# Patient Record
Sex: FEMALE
[De-identification: names, ages, dates, MRNs, and addresses within clinical notes are randomized per-mention and may not be internally consistent; named-entity substitution may affect disease eponyms.]

---

## 2021-02-10 ENCOUNTER — RX ONLY (RX ONLY)
Age: 46
End: 2021-02-10

## 2021-02-10 ENCOUNTER — SEE NOTE (OUTPATIENT)
Dept: URBAN - METROPOLITAN AREA CLINIC 31 | Facility: CLINIC | Age: 46
Setting detail: DERMATOLOGY
End: 2021-02-10

## 2021-02-10 DIAGNOSIS — L82.0 INFLAMED SEBORRHEIC KERATOSIS: ICD-10-CM

## 2021-02-10 PROCEDURE — 99204 OFFICE O/P NEW MOD 45 MIN: CPT

## 2021-02-10 RX ORDER — TRETINOIN 0.25 MG/G
1 APPLICATION CREAM TOPICAL NIGHTLY
Qty: 45 | Refills: 3
Start: 2021-02-10

## 2021-02-24 ENCOUNTER — LASER CONSULT - N/C (OUTPATIENT)
Dept: URBAN - METROPOLITAN AREA CLINIC 30 | Facility: CLINIC | Age: 46
Setting detail: DERMATOLOGY
End: 2021-02-24

## 2021-02-24 DIAGNOSIS — Z41.9 ENCOUNTER FOR PROCEDURE FOR PURPOSES OTHER THAN REMEDYING HEALTH STATE, UNSPECIFIED: ICD-10-CM

## 2021-02-24 PROCEDURE — OTHER LASER CONSULT NC: OTHER

## 2021-03-02 ENCOUNTER — GM-FACE (INCL LIP, CHIN, UNDER CHIN, CHEEKS & SIDE (OUTPATIENT)
Dept: URBAN - METROPOLITAN AREA CLINIC 30 | Facility: CLINIC | Age: 46
Setting detail: DERMATOLOGY
End: 2021-03-02

## 2021-03-02 DIAGNOSIS — D48.5 NEOPLASM OF UNCERTAIN BEHAVIOR OF SKIN: ICD-10-CM

## 2021-03-02 PROCEDURE — OTHER LHR-(GMAX BH)-FACE (INCLUDES LIP, CHIN, UNDER THE CHIN, CHEEKS & SIDEBURNS): OTHER

## 2021-04-08 ENCOUNTER — GM-LIP, UPPER INCLUDING CHIN (OUTPATIENT)
Dept: URBAN - METROPOLITAN AREA CLINIC 30 | Facility: CLINIC | Age: 46
Setting detail: DERMATOLOGY
End: 2021-04-08

## 2021-04-08 DIAGNOSIS — R68.89 OTHER GENERAL SYMPTOMS AND SIGNS: ICD-10-CM

## 2021-04-08 PROCEDURE — OTHER LHR-(GMAX BH)-FACE (INCLUDES LIP, CHIN, UNDER THE CHIN, CHEEKS & SIDEBURNS): OTHER

## 2021-07-02 ENCOUNTER — GM-FACE (INCL LIP, CHIN, UNDER CHIN, CHEEKS & SIDE (OUTPATIENT)
Dept: URBAN - METROPOLITAN AREA CLINIC 30 | Facility: CLINIC | Age: 46
Setting detail: DERMATOLOGY
End: 2021-07-02

## 2021-07-02 PROCEDURE — OTHER LHR-(GMAX BH)-FACE (INCLUDES LIP, CHIN, UNDER THE CHIN, CHEEKS & SIDEBURNS): OTHER

## 2021-08-06 ENCOUNTER — GM-FACE (INCL LIP, CHIN, UNDER CHIN, CHEEKS & SIDE (OUTPATIENT)
Dept: URBAN - METROPOLITAN AREA CLINIC 30 | Facility: CLINIC | Age: 46
Setting detail: DERMATOLOGY
End: 2021-08-06

## 2021-08-06 DIAGNOSIS — D48.5 NEOPLASM OF UNCERTAIN BEHAVIOR OF SKIN: ICD-10-CM

## 2021-08-06 PROCEDURE — OTHER LHR-(GMAX BH)-FACE (INCLUDES LIP, CHIN, UNDER THE CHIN, CHEEKS & SIDEBURNS): OTHER

## 2021-09-17 ENCOUNTER — GM-FACE (INCL LIP, CHIN, UNDER CHIN, CHEEKS & SIDE (OUTPATIENT)
Dept: URBAN - METROPOLITAN AREA CLINIC 30 | Facility: CLINIC | Age: 46
Setting detail: DERMATOLOGY
End: 2021-09-17

## 2021-09-17 DIAGNOSIS — L70.0 ACNE VULGARIS: ICD-10-CM

## 2021-09-17 PROCEDURE — OTHER LHR-(GMAX BH)-FACE (INCLUDES LIP, CHIN, UNDER THE CHIN, CHEEKS & SIDEBURNS): OTHER

## 2021-10-20 ENCOUNTER — GM-FACE (INCL LIP, CHIN, UNDER CHIN, CHEEKS & SIDE (OUTPATIENT)
Dept: URBAN - METROPOLITAN AREA CLINIC 30 | Facility: CLINIC | Age: 46
Setting detail: DERMATOLOGY
End: 2021-10-20

## 2021-10-20 DIAGNOSIS — L72.3 SEBACEOUS CYST: ICD-10-CM

## 2021-10-20 PROCEDURE — VBEAM VBEAM LASER TREATMENT: HCPCS

## 2021-10-20 PROCEDURE — OTHER LHR-(GMAX BH)-LIP, UPPER INC CHIN: OTHER

## 2021-11-23 ENCOUNTER — GM-FACE (INCL LIP, CHIN, UNDER CHIN, CHEEKS & SIDE (OUTPATIENT)
Dept: URBAN - METROPOLITAN AREA CLINIC 30 | Facility: CLINIC | Age: 46
Setting detail: DERMATOLOGY
End: 2021-11-23

## 2021-11-23 DIAGNOSIS — L20.84 INTRINSIC (ALLERGIC) ECZEMA: ICD-10-CM

## 2021-11-23 DIAGNOSIS — D22.39 MELANOCYTIC NEVI OF OTHER PARTS OF FACE: ICD-10-CM

## 2021-11-23 PROCEDURE — OTHER COSMETIC TREATMENT: OTHER

## 2021-11-23 PROCEDURE — OTHER LHR-(GMAX BH)-LIP, UPPER INC CHIN: OTHER

## 2021-12-22 ENCOUNTER — GM-FACE (INCL LIP, CHIN, UNDER CHIN, CHEEKS & SIDE (OUTPATIENT)
Dept: URBAN - METROPOLITAN AREA CLINIC 30 | Facility: CLINIC | Age: 46
Setting detail: DERMATOLOGY
End: 2021-12-22

## 2021-12-22 DIAGNOSIS — Z41.9 ENCOUNTER FOR PROCEDURE FOR PURPOSES OTHER THAN REMEDYING HEALTH STATE, UNSPECIFIED: ICD-10-CM

## 2021-12-22 PROCEDURE — OTHER LHR-(GMAX BH)-LIP, UPPER INC CHIN: OTHER

## 2021-12-22 PROCEDURE — OTHER COSMETIC TREATMENT: OTHER

## 2022-01-20 ENCOUNTER — LASER CONSULT - N/C (OUTPATIENT)
Dept: URBAN - METROPOLITAN AREA CLINIC 30 | Facility: CLINIC | Age: 47
Setting detail: DERMATOLOGY
End: 2022-01-20

## 2022-01-20 DIAGNOSIS — L81.1 CHLOASMA: ICD-10-CM

## 2022-01-20 PROCEDURE — OTHER LASER CONSULT NC: OTHER

## 2023-02-17 ENCOUNTER — APPOINTMENT (OUTPATIENT)
Dept: URBAN - METROPOLITAN AREA CLINIC 206 | Age: 48
Setting detail: DERMATOLOGY
End: 2023-02-18

## 2023-02-17 DIAGNOSIS — Z41.9 ENCOUNTER FOR PROCEDURE FOR PURPOSES OTHER THAN REMEDYING HEALTH STATE, UNSPECIFIED: ICD-10-CM

## 2023-02-17 PROCEDURE — OTHER LASER HAIR REMOVAL: OTHER

## 2023-02-17 ASSESSMENT — LOCATION SIMPLE DESCRIPTION DERM
LOCATION SIMPLE: CHIN
LOCATION SIMPLE: RIGHT CHEEK
LOCATION SIMPLE: LEFT CHEEK
LOCATION SIMPLE: SUBMENTAL CHIN
LOCATION SIMPLE: LEFT SUBMANDIBULAR AREA
LOCATION SIMPLE: RIGHT SUBMANDIBULAR AREA

## 2023-02-17 ASSESSMENT — LOCATION DETAILED DESCRIPTION DERM
LOCATION DETAILED: SUBMENTAL CHIN
LOCATION DETAILED: LEFT SUBMANDIBULAR AREA
LOCATION DETAILED: RIGHT INFERIOR CENTRAL MALAR CHEEK
LOCATION DETAILED: LEFT SUPERIOR CENTRAL BUCCAL CHEEK
LOCATION DETAILED: LEFT CHIN
LOCATION DETAILED: RIGHT SUBMANDIBULAR AREA

## 2023-02-17 ASSESSMENT — LOCATION ZONE DERM: LOCATION ZONE: FACE

## 2023-02-24 ENCOUNTER — APPOINTMENT (OUTPATIENT)
Dept: URBAN - METROPOLITAN AREA CLINIC 206 | Age: 48
Setting detail: DERMATOLOGY
End: 2023-02-25

## 2023-02-24 ENCOUNTER — APPOINTMENT (OUTPATIENT)
Dept: URBAN - METROPOLITAN AREA CLINIC 206 | Age: 48
Setting detail: DERMATOLOGY
End: 2023-02-24

## 2023-02-24 DIAGNOSIS — L72.8 OTHER FOLLICULAR CYSTS OF THE SKIN AND SUBCUTANEOUS TISSUE: ICD-10-CM

## 2023-02-24 DIAGNOSIS — Z41.9 ENCOUNTER FOR PROCEDURE FOR PURPOSES OTHER THAN REMEDYING HEALTH STATE, UNSPECIFIED: ICD-10-CM

## 2023-02-24 DIAGNOSIS — L82.1 OTHER SEBORRHEIC KERATOSIS: ICD-10-CM

## 2023-02-24 PROCEDURE — OTHER CONSULTATION EXCISION: OTHER

## 2023-02-24 PROCEDURE — OTHER COUNSELING: OTHER

## 2023-02-24 PROCEDURE — OTHER MIPS QUALITY: OTHER

## 2023-02-24 PROCEDURE — OTHER LASER HAIR REMOVAL: OTHER

## 2023-02-24 PROCEDURE — 99213 OFFICE O/P EST LOW 20 MIN: CPT

## 2023-02-24 ASSESSMENT — LOCATION DETAILED DESCRIPTION DERM
LOCATION DETAILED: RIGHT LATERAL MALAR CHEEK
LOCATION DETAILED: RIGHT SUPERIOR LATERAL MALAR CHEEK
LOCATION DETAILED: RIGHT INFERIOR PREAURICULAR CHEEK
LOCATION DETAILED: MONS PUBIS

## 2023-02-24 ASSESSMENT — LOCATION ZONE DERM
LOCATION ZONE: FACE
LOCATION ZONE: VULVA

## 2023-02-24 ASSESSMENT — LOCATION SIMPLE DESCRIPTION DERM
LOCATION SIMPLE: RIGHT CHEEK
LOCATION SIMPLE: GROIN

## 2023-03-31 ENCOUNTER — APPOINTMENT (OUTPATIENT)
Dept: URBAN - METROPOLITAN AREA CLINIC 206 | Age: 48
Setting detail: DERMATOLOGY
End: 2023-04-03

## 2023-03-31 DIAGNOSIS — Z41.9 ENCOUNTER FOR PROCEDURE FOR PURPOSES OTHER THAN REMEDYING HEALTH STATE, UNSPECIFIED: ICD-10-CM

## 2023-03-31 PROCEDURE — OTHER LASER HAIR REMOVAL: OTHER

## 2023-03-31 ASSESSMENT — LOCATION ZONE DERM: LOCATION ZONE: VULVA

## 2023-03-31 ASSESSMENT — LOCATION SIMPLE DESCRIPTION DERM: LOCATION SIMPLE: GROIN

## 2023-03-31 ASSESSMENT — LOCATION DETAILED DESCRIPTION DERM: LOCATION DETAILED: MONS PUBIS

## 2023-06-16 ENCOUNTER — APPOINTMENT (OUTPATIENT)
Dept: URBAN - METROPOLITAN AREA CLINIC 206 | Age: 48
Setting detail: DERMATOLOGY
End: 2023-06-18

## 2023-06-16 DIAGNOSIS — Z41.9 ENCOUNTER FOR PROCEDURE FOR PURPOSES OTHER THAN REMEDYING HEALTH STATE, UNSPECIFIED: ICD-10-CM

## 2023-06-16 PROCEDURE — OTHER LASER HAIR REMOVAL: OTHER

## 2023-06-16 ASSESSMENT — LOCATION ZONE DERM
LOCATION ZONE: VULVA
LOCATION ZONE: LIP
LOCATION ZONE: NECK
LOCATION ZONE: FACE

## 2023-06-16 ASSESSMENT — LOCATION DETAILED DESCRIPTION DERM
LOCATION DETAILED: RIGHT SUPERIOR ANTERIOR NECK
LOCATION DETAILED: RIGHT UPPER CUTANEOUS LIP
LOCATION DETAILED: MONS PUBIS
LOCATION DETAILED: RIGHT CHIN

## 2023-06-16 ASSESSMENT — LOCATION SIMPLE DESCRIPTION DERM
LOCATION SIMPLE: CHIN
LOCATION SIMPLE: RIGHT ANTERIOR NECK
LOCATION SIMPLE: GROIN
LOCATION SIMPLE: RIGHT LIP

## 2023-06-16 NOTE — PROCEDURE: LASER HAIR REMOVAL
Price (Use Numbers Only, No Special Characters Or $): 571 Price (Use Numbers Only, No Special Characters Or $): 930

## 2023-07-28 ENCOUNTER — APPOINTMENT (OUTPATIENT)
Dept: URBAN - METROPOLITAN AREA CLINIC 206 | Age: 48
Setting detail: DERMATOLOGY
End: 2023-07-30

## 2023-07-28 DIAGNOSIS — Z41.9 ENCOUNTER FOR PROCEDURE FOR PURPOSES OTHER THAN REMEDYING HEALTH STATE, UNSPECIFIED: ICD-10-CM

## 2023-07-28 PROCEDURE — OTHER LASER HAIR REMOVAL: OTHER

## 2023-07-28 ASSESSMENT — LOCATION SIMPLE DESCRIPTION DERM: LOCATION SIMPLE: GROIN

## 2023-07-28 ASSESSMENT — LOCATION ZONE DERM: LOCATION ZONE: VULVA

## 2023-07-28 ASSESSMENT — LOCATION DETAILED DESCRIPTION DERM: LOCATION DETAILED: MONS PUBIS

## 2024-08-02 ENCOUNTER — APPOINTMENT (OUTPATIENT)
Dept: URBAN - METROPOLITAN AREA CLINIC 206 | Age: 49
Setting detail: DERMATOLOGY
End: 2024-08-08

## 2024-08-02 DIAGNOSIS — Z41.9 ENCOUNTER FOR PROCEDURE FOR PURPOSES OTHER THAN REMEDYING HEALTH STATE, UNSPECIFIED: ICD-10-CM

## 2024-08-02 PROCEDURE — OTHER LASER HAIR REMOVAL: OTHER

## 2024-08-02 ASSESSMENT — LOCATION DETAILED DESCRIPTION DERM: LOCATION DETAILED: PHILTRUM

## 2024-08-02 ASSESSMENT — LOCATION SIMPLE DESCRIPTION DERM: LOCATION SIMPLE: UPPER LIP

## 2024-08-02 ASSESSMENT — LOCATION ZONE DERM: LOCATION ZONE: LIP

## 2024-08-02 NOTE — PROCEDURE: LASER HAIR REMOVAL
Detail Level: Zone
Fluence (Will Not Render If 0): 10
Laser Type: Nd:Yag 1064nm
Total Area (Optional- Include Units): upper lip
Cooling: DCD setting
Eye Shield Text: Protective laser goggle eye shields were placed over patients eyes prior to treatment
Consent: Written consent obtained, risks reviewed including but not limited to crusting, scabbing, blistering, scarring, darker or lighter pigmentary change, paradoxical hair regrowth, incomplete removal of hair and infection.
Shaving (Optional): The patient was shaved in the office prior to the procedure
Fluence (Will Not Render If 0): 14
Total Pulses (Settings #4): 110
Number Of Prepaid Treatments (Will Not Render If 0): 0
Spot Size: 15 mm
Fluence (Will Not Render If 0): 20
Pre-Procedure: Prior to proceeding the treatment areas were cleaned and all present put on their eye goggles
Post-Care Instructions: I reviewed with the patient in detail post-care instructions. Patient should avoid sun for a minimum of 4 weeks before and after treatment.
Spot Size: 18 mm
Post-Procedure Care: Elta md clear was applied after treatment. Post care reviewed with patient.
Total Pulses: 250
Tolerated Procedure (Optional): Tolerated Well
Render Post-Care In The Note: No
Were Eye Shields Employed?: Yes

## 2024-08-07 ENCOUNTER — APPOINTMENT (OUTPATIENT)
Dept: URBAN - METROPOLITAN AREA CLINIC 206 | Age: 49
Setting detail: DERMATOLOGY
End: 2024-08-08

## 2024-08-07 DIAGNOSIS — Z41.9 ENCOUNTER FOR PROCEDURE FOR PURPOSES OTHER THAN REMEDYING HEALTH STATE, UNSPECIFIED: ICD-10-CM

## 2024-08-07 PROCEDURE — OTHER LASER HAIR REMOVAL: OTHER

## 2024-08-07 ASSESSMENT — LOCATION SIMPLE DESCRIPTION DERM: LOCATION SIMPLE: GROIN

## 2024-08-07 ASSESSMENT — LOCATION ZONE DERM: LOCATION ZONE: VULVA

## 2024-08-07 ASSESSMENT — LOCATION DETAILED DESCRIPTION DERM: LOCATION DETAILED: MONS PUBIS

## 2024-08-07 NOTE — PROCEDURE: LASER HAIR REMOVAL
Were Eye Shields Employed?: Yes
External Cooling Fan Speed: 0
Pulse Duration (Include Units): 10
Laser Type: Nd:Yag 1064nm
Cooling: DCD setting
Consent: Written consent obtained, risks reviewed including but not limited to crusting, scabbing, blistering, scarring, darker or lighter pigmentary change, paradoxical hair regrowth, incomplete removal of hair and infection.
Total Area (Optional- Include Units): Brazilian
Spot Size: 15 mm
Detail Level: Zone
Total Pulses (Settings #4): 110
Fluence (Will Not Render If 0): 14
Topical Anesthesia Type: BLT (benzocaine 23%, lidocaine 10%, tetracaine 10%)
Eye Shield Text: Protective laser goggle eye shields were placed over patients eyes prior to treatment
Post-Care Instructions: I reviewed with the patient in detail post-care instructions. Patient should avoid sun for a minimum of 4 weeks before and after treatment.
Shaving (Optional): The patient was shaved in the office prior to the procedure
Treatment Number: 1
Pre-Procedure: Prior to proceeding the treatment areas were cleaned and all present put on their eye goggles
Fluence (Will Not Render If 0): 20
Spot Size: 18 mm
Tolerated Procedure (Optional): Tolerated Well
Render Post-Care In The Note: No
Length Of Topical Anesthesia Application (Optional): 30 minutes
Total Pulses: 250
Post-Procedure Care: Elta md clear was applied after treatment. Post care reviewed with patient.

## 2024-09-06 ENCOUNTER — APPOINTMENT (OUTPATIENT)
Dept: URBAN - METROPOLITAN AREA CLINIC 206 | Age: 49
Setting detail: DERMATOLOGY
End: 2024-09-07

## 2024-09-06 DIAGNOSIS — Z41.9 ENCOUNTER FOR PROCEDURE FOR PURPOSES OTHER THAN REMEDYING HEALTH STATE, UNSPECIFIED: ICD-10-CM

## 2024-09-06 PROCEDURE — OTHER LASER HAIR REMOVAL: OTHER

## 2024-09-06 ASSESSMENT — LOCATION SIMPLE DESCRIPTION DERM
LOCATION SIMPLE: GLABELLA
LOCATION SIMPLE: GROIN

## 2024-09-06 ASSESSMENT — LOCATION DETAILED DESCRIPTION DERM
LOCATION DETAILED: GLABELLA
LOCATION DETAILED: MONS PUBIS

## 2024-09-06 ASSESSMENT — LOCATION ZONE DERM
LOCATION ZONE: VULVA
LOCATION ZONE: FACE

## 2024-09-06 NOTE — PROCEDURE: LASER HAIR REMOVAL
Treatment Number: 0
Were Eye Shields Employed?: Yes
Fluence (Will Not Render If 0): 20
Cooling: DCD setting
Treatment Number: 2
Detail Level: Zone
Spot Size: 18 mm
Tolerated Procedure (Optional): Tolerated Well
Fluence (Will Not Render If 0): 14
Total Pulses: 250
Pre-Procedure: Prior to proceeding the treatment areas were cleaned and all present put on their eye goggles
Render Post-Care In The Note: No
Length Of Topical Anesthesia Application (Optional): 20 minutes
Shaving (Optional): The patient was shaved in the office prior to the procedure
Eye Shield Text: Protective laser goggle eye shields were placed over patients eyes prior to treatment
Fluence (Will Not Render If 0): 10
Post-Procedure Care: Elta md  was applied after treatment to face and cetaphil moisturizer to bikini area. Post care reviewed with patient.
Spot Size: 15 mm
Topical Anesthesia Type: 20% betacaine, 6% lidocaine, 4% tetracaine
Laser Type: Nd:Yag 1064nm
Total Pulses (Settings #4): 110
Consent: Written consent obtained, risks reviewed including but not limited to crusting, scabbing, blistering, scarring, darker or lighter pigmentary change, paradoxical hair regrowth, incomplete removal of hair and infection.
Post-Care Instructions: I reviewed with the patient in detail post-care instructions. Patient should avoid sun for a minimum of 4 weeks before and after treatment.

## 2024-10-11 ENCOUNTER — APPOINTMENT (OUTPATIENT)
Dept: URBAN - METROPOLITAN AREA CLINIC 206 | Age: 49
Setting detail: DERMATOLOGY
End: 2024-10-14

## 2024-10-11 DIAGNOSIS — Z41.9 ENCOUNTER FOR PROCEDURE FOR PURPOSES OTHER THAN REMEDYING HEALTH STATE, UNSPECIFIED: ICD-10-CM

## 2024-10-11 PROCEDURE — OTHER LASER HAIR REMOVAL: OTHER

## 2024-10-11 ASSESSMENT — LOCATION DETAILED DESCRIPTION DERM
LOCATION DETAILED: MONS PUBIS
LOCATION DETAILED: LEFT MEDIAL EYEBROW

## 2024-10-11 ASSESSMENT — LOCATION SIMPLE DESCRIPTION DERM
LOCATION SIMPLE: GROIN
LOCATION SIMPLE: LEFT EYEBROW

## 2024-10-11 ASSESSMENT — LOCATION ZONE DERM
LOCATION ZONE: VULVA
LOCATION ZONE: FACE

## 2024-10-11 NOTE — PROCEDURE: LASER HAIR REMOVAL
Fluence (Will Not Render If 0): 10
Pre-Procedure: Prior to proceeding the treatment areas were cleaned and all present put on their eye goggles
Total Pulses: 250
Treatment Number: 0
Treatment Number: 3
Spot Size: 15 mm
Spot Size: 18 mm
Fluence (Will Not Render If 0): 20
Consent: Written consent obtained, risks reviewed including but not limited to crusting, scabbing, blistering, scarring, darker or lighter pigmentary change, paradoxical hair regrowth, incomplete removal of hair and infection.
Total Pulses (Settings #4): 110
Tolerated Procedure (Optional): Tolerated Well
Total Area (Optional- Include Units): face/Icelandic
Cooling: DCD setting
Post-Care Instructions: I reviewed with the patient in detail post-care instructions. Patient should avoid sun for a minimum of 4 weeks before and after treatment.
Were Eye Shields Employed?: Yes
Post-Procedure Care: Rebalance was applied after treatment to bikini area and elta clear to face Post care reviewed with patient.
Detail Level: Zone
Fluence (Will Not Render If 0): 14
Total Pulses: 448
Laser Type: Nd:Yag 1064nm
Shaving (Optional): The patient shaved at home
Length Of Topical Anesthesia Application (Optional): 20 minutes
Eye Shield Text: Protective laser goggle eye shields were placed over patients eyes prior to treatment
Render Post-Care In The Note: No
Topical Anesthesia Type: 20% betacaine, 6% lidocaine, 4% tetracaine

## 2024-11-08 ENCOUNTER — APPOINTMENT (OUTPATIENT)
Dept: URBAN - METROPOLITAN AREA CLINIC 206 | Age: 49
Setting detail: DERMATOLOGY
End: 2024-11-13

## 2024-11-08 DIAGNOSIS — Z41.9 ENCOUNTER FOR PROCEDURE FOR PURPOSES OTHER THAN REMEDYING HEALTH STATE, UNSPECIFIED: ICD-10-CM

## 2024-11-08 PROCEDURE — OTHER LASER HAIR REMOVAL: OTHER

## 2024-11-08 ASSESSMENT — LOCATION ZONE DERM: LOCATION ZONE: VULVA

## 2024-11-08 ASSESSMENT — LOCATION DETAILED DESCRIPTION DERM: LOCATION DETAILED: MONS PUBIS

## 2024-11-08 ASSESSMENT — LOCATION SIMPLE DESCRIPTION DERM: LOCATION SIMPLE: GROIN

## 2024-11-08 NOTE — PROCEDURE: LASER HAIR REMOVAL
Post-Procedure Care: Cetaphil moisturizer was applied after treatment to area and Post care reviewed with patient.
Render Post-Care In The Note: No
Topical Anesthesia Type: BLT cream (benzocaine 30%, lidocaine 12%, tetracaine 6%)
Total Pulses: 355
Eye Shield Text: Protective laser goggle eye shields were placed over patients eyes prior to treatment
Treatment Number: 0
Pulse Duration (Include Units): 10
Laser Type: Nd:Yag 1064nm
Cooling: DCD setting
Fluence (Will Not Render If 0): 20
Total Pulses (Settings #4): 110
Total Pulses: 250
Consent: Written consent obtained, risks reviewed including but not limited to crusting, scabbing, blistering, scarring, darker or lighter pigmentary change, paradoxical hair regrowth, incomplete removal of hair and infection.
Tolerated Procedure (Optional): Tolerated Well
Spot Size: 18 mm
Spot Size: 15 mm
Were Eye Shields Employed?: Yes
Treatment Number: 4
Pre-Procedure: Prior to proceeding the treatment areas were cleaned and all present put on their eye goggles
Length Of Topical Anesthesia Application (Optional): 20 minutes
Post-Care Instructions: I reviewed with the patient in detail post-care instructions. Patient should avoid sun for a minimum of 4 weeks before and after treatment.
Detail Level: Zone
Fluence (Will Not Render If 0): 14
Shaving (Optional): The patient shaved at home